# Patient Record
Sex: MALE | Race: WHITE
[De-identification: names, ages, dates, MRNs, and addresses within clinical notes are randomized per-mention and may not be internally consistent; named-entity substitution may affect disease eponyms.]

---

## 2018-02-06 ENCOUNTER — HOSPITAL ENCOUNTER (OUTPATIENT)
Dept: HOSPITAL 47 - SLEEP | Age: 53
Discharge: HOME | End: 2018-02-06
Payer: COMMERCIAL

## 2018-02-06 DIAGNOSIS — Z99.89: ICD-10-CM

## 2018-02-06 DIAGNOSIS — G47.33: Primary | ICD-10-CM

## 2018-02-06 DIAGNOSIS — E66.9: ICD-10-CM

## 2018-02-06 NOTE — PN
PROGRESS NOTE



HISTORY:

A 52-year-old male patient diagnosed having obstructive sleep apnea, coming in for

annual check. His baseline AHI is 43 and currently he is on CPAP pressure of 9 cm of

water.  The patient continues to lose weight.  He lost 20 pounds approximately a year

ago and he has lost another 20 pounds and overall has lost around 40 pounds.  He is

still using his CPAP and his treatment is successful.  He feels that without treatment

he becomes somnolent and sleepy.  He is getting all of his CPAP supplies on a regular

basis.  He has no new complaints and his treatment remains successful. No snoring while

on treatment. He is averaging more than 7 hours of CPAP use at night.  He is waking up

much more alert and refreshed during the day.  No other new onset comorbidities for

now.



PHYSICAL EXAM:

BP is 120/72, pulse 80, respirations 16, temperature 97.2 saturation 99% on room air.

Weight is 280 and height is 71-1/2 inches. Greer score is 5. BMI 31.6.

GENERAL APPEARANCE: Calm comfortable.

HEAD: Atraumatic, normocephalic.  Mallampati class 4.

LUNGS: Clear to auscultation.

HEART: Regular rate and rhythm.  Normal S1, S2.  No S3.  No murmur.

ABDOMEN: Soft, nontender.  No organomegaly.

EXTREMITIES: No edema, cyanosis or clubbing.

SKIN: Negative for any open wounds or sores.

NEUROLOGIC:  Alert and oriented x3.  There is no focal neurological deficit.

PSYCHIATRICALLY:  Appropriate mood and affect.



IMPRESSION:

1. Severe symptomatic obstructive sleep apnea with an AHI of 43, interval 40 pounds

    weight loss.  Continues to receive successful CPAP therapy pressure at 9 cm of

    water.  The patient is asking for his machine to be upgraded and I think it is

    reasonable to upgrade this patient to automatic CPAP unit to maximize the pressure

    at 9 and this will allow the patient to utilize lower pressure, especially with his

    ongoing weight loss.

2. Obesity with more than 40 pounds weight loss.

3. Hypersomnia, improved.



PLAN:

1. I ordered this patient an automatic Resmed unit with a maximum pressure of 9,

    minimum pressure of 4, with the same mask interface.

2. See me back in 90 days for a compliancy check.

3. His treatment is successful.

4. Encouraged further weight loss.





MMODL / IJN: [id number] / Job#: [id number]

## 2018-05-15 ENCOUNTER — HOSPITAL ENCOUNTER (OUTPATIENT)
Dept: HOSPITAL 47 - SLEEP | Age: 53
Discharge: HOME | End: 2018-05-15
Payer: COMMERCIAL

## 2018-05-15 DIAGNOSIS — E66.9: ICD-10-CM

## 2018-05-15 DIAGNOSIS — G47.33: Primary | ICD-10-CM

## 2018-05-15 DIAGNOSIS — Z99.89: ICD-10-CM

## 2018-05-15 NOTE — PN
PROGRESS NOTE



This is a 52-year-old male patient who was diagnosed having obstructive sleep apnea

with an AHI of 43.  The patient was on CPAP with a pressure of 9 cm of water.  During

his last evaluation approximately 3 months ago, the patient was having successful

weight loss and he was having some difficulty tolerating the CPAP, stating that the

pressure was high.  Based on that, I switched this patient to an APAP mode with a

minimum pressure of 4, maximum pressure of 9 and today he is coming to see me in

followup.  His weight has remained stable.  The APAP mode is more comfortable with the

patient.  He is able to tolerate his CPAP every night and his CPAP compliance for more

than 4 hours is 100%.  He has no periodic breathing.  He has excellent mask seal.  AHI

while on treatment is down to 4.3.  His average CPAP is around 6.5 hours per night and

his P90 pressure is at 9 cm of water.  He is benefitting from the treatment and he has

no specific complaints.



REVIEW OF SYSTEMS:

CONSTITUTIONAL: Positive for weight loss.

HEENT:  Negative for snoring while on CPAP therapy.  The patient is using a Mirage

Quattro FX mask.

LUNGS:  No cough or sputum production.

CARDIAC:  No angina, no palpitations.

GI:  Negative for nausea, vomiting.

: Negative for dysuria, frequency, urgency.

MUSCULOSKELETAL:  Negative for skeletal injuries or pain.

Skin is negative for any wounds or ulceration.



The rest of the positive findings were mentioned above in the history of present

illness.





BP is 128/73, pulse 80, respirations 16, weight is 246, temperature 98.2, saturation

98% on room air.

GENERAL APPEARANCE:  Calm, comfortable.

Head is atraumatic, normocephalic.  Neck is short, supple, chronic posterior pharynx

with no goiter or neck masses.

LUNGS:  Clear to auscultation.

HEART:  Sounds are regular rate and rhythm.  Normal S1, S2.  No murmurs.

ABDOMEN:  Soft, nontender.  No organomegaly.

EXTREMITIES:  No edema.  No cyanosis or clubbing.

Skin is negative for any ulcerations, wounds or cellulitis.  Psych is negative for any

anxiety or depression.

NEUROLOGIC:  Patient is alert and oriented x3.  There is no focal neurological

deficits.



IMPRESSION:

1. Severe symptomatic obstructive sleep apnea with an apnea-hypopnea index of 43.  The

    patient has lost significant amount of weight and currently he is on APAP with a

    maximum pressure of 9 and minimum pressure of 4.  His compliance data shows

    excellent use and the patient is benefitting from treatment.

2. Hypersomnia, improved.

3. Obesity, with ongoing weight loss.



PLAN:

1. Continue treatment at same pressure setting.

2. Encourage further weight loss.

3. Keep same mask interface which is a Mirage Quattro FX.

4. See me back in a year's time or earlier if needed.





MMODL / IJN: [id number] / Job#: [id number]

## 2021-09-16 ENCOUNTER — HOSPITAL ENCOUNTER (OUTPATIENT)
Dept: HOSPITAL 47 - RADXRYALE | Age: 56
Discharge: HOME | End: 2021-09-16
Attending: INTERNAL MEDICINE
Payer: COMMERCIAL

## 2021-09-16 DIAGNOSIS — R07.81: Primary | ICD-10-CM

## 2021-09-16 NOTE — XR
EXAMINATION TYPE: PA chest and left rib series, 5 views

 

DATE OF EXAM: 9/16/2021

 

Comparison: None

 

Clinical History: 55-year-old male  PLEURODYNIA

 

Findings:

 The cardiomediastinal silhouette, aorta, and pulmonary vasculature are within normal limits. Some st
domingo atelectasis of the left base. No consolidation or pleural effusion. No displaced left rib fract
ure seen.

 

 

Impression:

 No acute cardiopulmonary process. No displaced left rib fractures seen.

## 2023-05-23 ENCOUNTER — HOSPITAL ENCOUNTER (OUTPATIENT)
Dept: HOSPITAL 47 - SLEEP | Age: 58
End: 2023-05-23
Attending: INTERNAL MEDICINE
Payer: COMMERCIAL

## 2023-05-23 DIAGNOSIS — E66.9: ICD-10-CM

## 2023-05-23 DIAGNOSIS — G47.33: Primary | ICD-10-CM

## 2023-05-23 DIAGNOSIS — Z99.89: ICD-10-CM

## 2023-05-23 PROCEDURE — 99211 OFF/OP EST MAY X REQ PHY/QHP: CPT

## 2023-05-23 NOTE — P.SLEEP
History of Present Illness


H&P Date: 05/23/23





57-year-old male patient with known history of obstructive sleep apnea.  The 

patient was treated through our sleep Center for many years.  Original diagnosis

was done approximately back in 2012.  The patient was subsequently given an 

updated CPAP unit which was a dream station and he was being treated 

successfully.  As known, has previous CPAP unit went on a recall and the patient

was given an updated machine which is a dream station II, and he is coming in 

for a compliancy check for the time being.  The patient is known to have RICHARD 

with a baseline AHI of 43 and the patient remains on CPAP pressure of 9 cm of 

water.  Over the past 5 years, he has gained weight.  Used to weigh around 146 

pounds and currently is up to 273.  He is using the Mirage Quattro large size 

full facemask.  He is responding well to the current CPAP unit.  He is very 

compliant.  His compliancy for more than 4 hours in the order of 80% and the 

patient has been averaging around 5 hours and 26 minutes of CPAP use per night 

and his AHI is down to 5.6.  As mentioned, the current CPAP unit is at 9 cm of 

water.  No snoring while on CPAP.  No major hypersomnia or sleepiness during the

day.  Is going to bed at around 11:30 PM and his waken of 6:15 AM in the 

morning.  He is averaging a good 6/7 hours of sleep.  He wakes a few times in 

the middle of the night.  Does not take any naps during the day.  No morning 

headaches.  No chest pain.  No shortness of breath.  No issues with hypertension

or congestion heart failure related to fibrillation.  No other complaints 

otherwise for now.





Review of Systems


Constitutional: Reports weight gain


Eyes: denies as per HPI, denies blurred vision, denies bulging eye, denies 

decreased vision, denies diplopia, denies discharge, denies dry eye, denies 

irritation, denies itching, denies pain, denies photophobia, denies loss of 

peripheral vision, denies loss of vision, denies tunnel vision/blind spots


Ears: deny: decreased hearing, ear discharge, earache, tinnitus


Ears, nose, mouth and throat: Reports as per HPI, Reports post-nasal drip


Cardiovascular: Reports as per HPI


Respiratory: Reports sleep apnea, Reports snoring


Gastrointestinal: Reports as per HPI


Genitourinary: Reports as per HPI


Musculoskeletal: Reports as per HPI


Musculoskeletal: absent: ankle pain, ankle stiffness, ankle swelling


Integumentary: Reports as per HPI


Neurological: Reports as per HPI


Psychiatric: Reports as per HPI


Endocrine: Reports as per HPI


Hematologic/Lymphatic: Reports as per HPI


Allergic/Immunologic: Reports as per HPI





Past Medical History


Past Medical History: Sleep Apnea/CPAP/BIPAP


Additional Past Surgical History / Comment(s): Previous ankle surgery for a 

fracture surgery was done in 2007 and right shoulder surgery in August 2013





Medications and Allergies


Home Medications and Allergies Comment(s): 





No medications





Physical Exam





BP is 139/76, pulse is 74, respirations 12, temperature is 97.7 and a pulse ox 

of 96% room air oxygen.  Patient has a height of 5 feet and 10 inches, weight is

273 and the patient's Climax score is at 8 and the size of the neck is 17 

inches.  His current body mass index is 40





The patient appeared well nourished and normally developed. Vital signs as 

documented. Head exam is unremarkable. No scleral icterus or corneal arcus 

noted. Neck is without jugular venous distension, thyromegaly, or carotid 

bruits. Carotid upstrokes are brisk bilaterally.  The patient is a Mallampati 

class IV with significant crowding of the posterior oropharynx Lungs are clear 

to auscultation and percussion. Cardiac exam reveals the PMI to be normally 

sized and situated. Rhythm is regular. First and second heart sounds normal. No 

murmurs, rubs or gallops. Abdominal exam reveals normal bowel sounds, no masses,

no organomegaly and no aortic enlargement. Extremities are nonedematous and both

femoral and pedal pulses are normal.Examination of the skin revealed no evidence

of significant rashes, suspicious appearing nevi or other concerning 

lesions.Neurologically, the patient is awake and alert and the patient does not 

have any focal neurological deficit.  Cranial nerves are essentially intact.





Assessment and Plan


Plan: 








Obstructive sleep apnea, severe with an AHI of 43, undergoing successful CPAP 

therapy.  The patient has an updated CPAP unit which is a dream station II





Chronic hypersomnia and cold with CPAP therapy, current Climax score is down to

8





Obesity with a BMI of 40





No other major comorbidities





Plan





The patient's CPAP machine was checked and over the past 30 days the patient 

demonstrated excellent compliancy.  Recommend continuing CPAP therapy the same 

level of pressure of 9 cm of water.  His treatment is successful.  No need for 

any further adjustments at this point in time.  Keep the same mask interface and

the patient is utilizing a Mirage Quatro large size full facemask.  He is trying

to lose weight.  He has gained weight over the past 4-5 years and despite his 

weight gain, treatment remains successful.  Maintain regular sleep schedule and 

regular sleep hygiene measures.  No other comorbidities.  We'll see him back in 

follow-up in few years time.  Supplies will be given on a regular basis.





Sleep Note





- Sleep Note


Sleep Note: 


Temperature: 


Pulse Rate: 


Respiratory Rate: 


Blood Pressure: 


SpO2: 


Height: 


Weight: 


BMI: 


Neck Circumference: